# Patient Record
Sex: MALE | Race: BLACK OR AFRICAN AMERICAN | NOT HISPANIC OR LATINO
[De-identification: names, ages, dates, MRNs, and addresses within clinical notes are randomized per-mention and may not be internally consistent; named-entity substitution may affect disease eponyms.]

---

## 2018-03-19 PROBLEM — Z00.00 ENCOUNTER FOR PREVENTIVE HEALTH EXAMINATION: Status: ACTIVE | Noted: 2018-03-19

## 2018-03-20 ENCOUNTER — APPOINTMENT (OUTPATIENT)
Dept: PULMONOLOGY | Facility: CLINIC | Age: 78
End: 2018-03-20
Payer: MEDICARE

## 2018-03-20 VITALS
HEART RATE: 63 BPM | BODY MASS INDEX: 21.79 KG/M2 | HEIGHT: 63 IN | WEIGHT: 123 LBS | OXYGEN SATURATION: 99 % | DIASTOLIC BLOOD PRESSURE: 68 MMHG | SYSTOLIC BLOOD PRESSURE: 108 MMHG

## 2018-03-20 PROCEDURE — 94727 GAS DIL/WSHOT DETER LNG VOL: CPT

## 2018-03-20 PROCEDURE — 99203 OFFICE O/P NEW LOW 30 MIN: CPT | Mod: 25

## 2018-03-20 PROCEDURE — 94060 EVALUATION OF WHEEZING: CPT

## 2018-03-20 PROCEDURE — 94729 DIFFUSING CAPACITY: CPT

## 2018-03-21 RX ORDER — ALBUTEROL SULFATE 90 UG/1
108 (90 BASE) AEROSOL, METERED RESPIRATORY (INHALATION)
Qty: 18 | Refills: 0 | Status: ACTIVE | COMMUNITY
Start: 2018-02-14

## 2018-03-21 RX ORDER — MOMETASONE FUROATE 1 MG/G
0.1 OINTMENT TOPICAL
Qty: 45 | Refills: 0 | Status: COMPLETED | COMMUNITY
Start: 2017-12-26

## 2018-03-21 RX ORDER — ALFUZOSIN HYDROCHLORIDE 10 MG/1
10 TABLET, EXTENDED RELEASE ORAL
Qty: 30 | Refills: 0 | Status: ACTIVE | COMMUNITY
Start: 2017-10-02

## 2018-03-21 RX ORDER — AZITHROMYCIN 250 MG/1
250 TABLET, FILM COATED ORAL
Qty: 6 | Refills: 0 | Status: COMPLETED | COMMUNITY
Start: 2018-03-12

## 2018-03-21 RX ORDER — CHLORHEXIDINE GLUCONATE, 0.12% ORAL RINSE 1.2 MG/ML
0.12 SOLUTION DENTAL
Qty: 473 | Refills: 0 | Status: COMPLETED | COMMUNITY
Start: 2018-03-05

## 2018-04-24 ENCOUNTER — LABORATORY RESULT (OUTPATIENT)
Age: 78
End: 2018-04-24

## 2018-04-24 ENCOUNTER — APPOINTMENT (OUTPATIENT)
Dept: PULMONOLOGY | Facility: CLINIC | Age: 78
End: 2018-04-24
Payer: MEDICARE

## 2018-04-24 VITALS
OXYGEN SATURATION: 100 % | SYSTOLIC BLOOD PRESSURE: 110 MMHG | HEIGHT: 63 IN | DIASTOLIC BLOOD PRESSURE: 60 MMHG | BODY MASS INDEX: 22.5 KG/M2 | WEIGHT: 127 LBS | HEART RATE: 68 BPM

## 2018-04-24 DIAGNOSIS — R05 COUGH: ICD-10-CM

## 2018-04-24 PROCEDURE — 99214 OFFICE O/P EST MOD 30 MIN: CPT

## 2018-06-01 LAB
A ALTERNATA IGE QN: <0.1 KUA/L
A ALTERNATA IGE QN: <0.1 KUA/L
A FUMIGATUS IGE QN: <0.1 KUA/L
A FUMIGATUS IGE QN: <0.1 KUA/L
ALMOND IGE QN: <0.1 KUA/L
BASOPHILS # BLD AUTO: 0.01 K/UL
BASOPHILS NFR BLD AUTO: 0.3 %
BRAZIL NUT IGE QN: <0.1 KUA/L
C ALBICANS IGE QN: <0.1 KUA/L
C HERBARUM IGE QN: <0.1 KUA/L
C HERBARUM IGE QN: <0.1 KUA/L
CAT DANDER IGE QN: <0.1 KUA/L
CLAM IGE QN: <0.1 KUA/L
COCONUT IGE QN: 0
COCONUT IGE QN: <0.1 KUA/L
CODFISH IGE QN: <0.1 KUA/L
COMMON RAGWEED IGE QN: <0.1 KUA/L
CORN IGE QN: <0.1 KUA/L
COW MILK IGE QN: <0.1 KUA/L
D FARINAE IGE QN: <0.1 KUA/L
D PTERONYSS IGE QN: <0.1 KUA/L
DEPRECATED A ALTERNATA IGE RAST QL: 0
DEPRECATED A ALTERNATA IGE RAST QL: 0
DEPRECATED A FUMIGATUS IGE RAST QL: 0
DEPRECATED A FUMIGATUS IGE RAST QL: 0
DEPRECATED ALMOND IGE RAST QL: 0
DEPRECATED BRAZIL NUT IGE RAST QL: 0
DEPRECATED C ALBICANS IGE RAST QL: 0
DEPRECATED C HERBARUM IGE RAST QL: 0
DEPRECATED C HERBARUM IGE RAST QL: 0
DEPRECATED CAT DANDER IGE RAST QL: 0
DEPRECATED CLAM IGE RAST QL: 0
DEPRECATED CODFISH IGE RAST QL: 0
DEPRECATED COMMON RAGWEED IGE RAST QL: 0
DEPRECATED CORN IGE RAST QL: 0
DEPRECATED COW MILK IGE RAST QL: 0
DEPRECATED D FARINAE IGE RAST QL: 0
DEPRECATED D PTERONYSS IGE RAST QL: 0
DEPRECATED DOG DANDER IGE RAST QL: 0
DEPRECATED EGG WHITE IGE RAST QL: 0
DEPRECATED HAZELNUT IGE RAST QL: 0
DEPRECATED M RACEMOSUS IGE RAST QL: 0
DEPRECATED P NOTATUM IGE RAST QL: 0
DEPRECATED PEANUT IGE RAST QL: 0
DEPRECATED PEANUT IGE RAST QL: 0
DEPRECATED ROACH IGE RAST QL: 0
DEPRECATED S ROSTRATA IGE RAST QL: 0
DEPRECATED SCALLOP IGE RAST QL: <0.1 KUA/L
DEPRECATED SESAME SEED IGE RAST QL: 0
DEPRECATED SHRIMP IGE RAST QL: 0
DEPRECATED SOYBEAN IGE RAST QL: 0
DEPRECATED TIMOTHY IGE RAST QL: 0
DEPRECATED WALNUT IGE RAST QL: 0
DEPRECATED WHEAT IGE RAST QL: 0
DEPRECATED WHITE OAK IGE RAST QL: 0
DOG DANDER IGE QN: <0.1 KUA/L
EGG WHITE IGE QN: <0.1 KUA/L
EOSINOPHIL # BLD AUTO: 0.11 K/UL
EOSINOPHIL NFR BLD AUTO: 3.4 %
HAZELNUT IGE QN: <0.1 KUA/L
HCT VFR BLD CALC: 39.8 %
HGB BLD-MCNC: 12.8 G/DL
IMM GRANULOCYTES NFR BLD AUTO: 0 %
LYMPHOCYTES # BLD AUTO: 1.04 K/UL
LYMPHOCYTES NFR BLD AUTO: 32.2 %
M RACEMOSUS IGE QN: <0.1 KUA/L
MAN DIFF?: NORMAL
MCHC RBC-ENTMCNC: 30.8 PG
MCHC RBC-ENTMCNC: 32.2 GM/DL
MCV RBC AUTO: 95.9 FL
MONOCYTES # BLD AUTO: 0.2 K/UL
MONOCYTES NFR BLD AUTO: 6.2 %
NEUTROPHILS # BLD AUTO: 1.87 K/UL
NEUTROPHILS NFR BLD AUTO: 57.9 %
P NOTATUM IGE QN: <0.1 KUA/L
PEANUT IGE QN: <0.1 KUA/L
PEANUT IGE QN: <0.1 KUA/L
PLATELET # BLD AUTO: 162 K/UL
RBC # BLD: 4.15 M/UL
RBC # FLD: 12.2 %
ROACH IGE QN: <0.1 KUA/L
S ROSTRATA IGE QN: <0.1 KUA/L
SCALLOP IGE QN: 0
SCALLOP IGE QN: <0.1 KUA/L
SESAME SEED IGE QN: <0.1 KUA/L
SOYBEAN IGE QN: <0.1 KUA/L
TIMOTHY IGE QN: <0.1 KUA/L
TOTAL IGE SMQN RAST: 19 KU/L
WALNUT IGE QN: <0.1 KUA/L
WBC # FLD AUTO: 3.23 K/UL
WHEAT IGE QN: <0.1 KUA/L
WHITE OAK IGE QN: <0.1 KUA/L

## 2018-08-14 ENCOUNTER — APPOINTMENT (OUTPATIENT)
Dept: PULMONOLOGY | Facility: CLINIC | Age: 78
End: 2018-08-14

## 2021-07-22 ENCOUNTER — APPOINTMENT (OUTPATIENT)
Dept: UROLOGY | Facility: CLINIC | Age: 81
End: 2021-07-22
Payer: MEDICARE

## 2021-07-22 PROCEDURE — 99244 OFF/OP CNSLTJ NEW/EST MOD 40: CPT

## 2021-07-22 PROCEDURE — 99204 OFFICE O/P NEW MOD 45 MIN: CPT

## 2021-07-23 LAB
APPEARANCE: CLEAR
BACTERIA: NEGATIVE
BILIRUBIN URINE: NEGATIVE
BLOOD URINE: NEGATIVE
COLOR: YELLOW
GLUCOSE QUALITATIVE U: NEGATIVE
HYALINE CASTS: 0 /LPF
KETONES URINE: NEGATIVE
LEUKOCYTE ESTERASE URINE: NEGATIVE
MICROSCOPIC-UA: NORMAL
NITRITE URINE: NEGATIVE
PH URINE: 5.5
PROTEIN URINE: NEGATIVE
PSA FREE FLD-MCNC: 13 %
PSA FREE SERPL-MCNC: 1.59 NG/ML
PSA SERPL-MCNC: 12.1 NG/ML
RED BLOOD CELLS URINE: 1 /HPF
SPECIFIC GRAVITY URINE: 1.02
SQUAMOUS EPITHELIAL CELLS: 0 /HPF
URINE CYTOLOGY: NORMAL
UROBILINOGEN URINE: NORMAL
WHITE BLOOD CELLS URINE: 0 /HPF

## 2021-07-26 ENCOUNTER — NON-APPOINTMENT (OUTPATIENT)
Age: 81
End: 2021-07-26

## 2021-07-27 ENCOUNTER — APPOINTMENT (OUTPATIENT)
Dept: MRI IMAGING | Facility: IMAGING CENTER | Age: 81
End: 2021-07-27

## 2021-08-10 RX ORDER — DIAZEPAM 5 MG/1
5 TABLET ORAL
Qty: 5 | Refills: 0 | Status: ACTIVE | COMMUNITY
Start: 2021-08-10 | End: 1900-01-01

## 2021-08-12 ENCOUNTER — NON-APPOINTMENT (OUTPATIENT)
Age: 81
End: 2021-08-12

## 2021-08-21 ENCOUNTER — OUTPATIENT (OUTPATIENT)
Dept: OUTPATIENT SERVICES | Facility: HOSPITAL | Age: 81
LOS: 1 days | End: 2021-08-21
Payer: MEDICARE

## 2021-08-21 ENCOUNTER — APPOINTMENT (OUTPATIENT)
Dept: MRI IMAGING | Facility: IMAGING CENTER | Age: 81
End: 2021-08-21
Payer: MEDICARE

## 2021-08-21 ENCOUNTER — RESULT REVIEW (OUTPATIENT)
Age: 81
End: 2021-08-21

## 2021-08-21 DIAGNOSIS — R97.20 ELEVATED PROSTATE SPECIFIC ANTIGEN [PSA]: ICD-10-CM

## 2021-08-21 PROCEDURE — 72197 MRI PELVIS W/O & W/DYE: CPT | Mod: 26,MH

## 2021-08-21 PROCEDURE — 76498 UNLISTED MR PROCEDURE: CPT

## 2021-08-21 PROCEDURE — A9585: CPT

## 2021-08-21 PROCEDURE — 76498P: CUSTOM | Mod: 26,MH

## 2021-08-21 PROCEDURE — 72197 MRI PELVIS W/O & W/DYE: CPT

## 2022-01-26 ENCOUNTER — APPOINTMENT (OUTPATIENT)
Dept: UROLOGY | Facility: CLINIC | Age: 82
End: 2022-01-26
Payer: MEDICARE

## 2022-01-26 DIAGNOSIS — R97.20 ELEVATED PROSTATE, SPECIFIC ANTIGEN [PSA]: ICD-10-CM

## 2022-01-26 PROCEDURE — 99214 OFFICE O/P EST MOD 30 MIN: CPT

## 2022-07-28 ENCOUNTER — APPOINTMENT (OUTPATIENT)
Dept: UROLOGY | Facility: CLINIC | Age: 82
End: 2022-07-28

## 2022-07-28 LAB
APPEARANCE: CLEAR
BACTERIA: NEGATIVE
BILIRUBIN URINE: NEGATIVE
BLOOD URINE: NEGATIVE
COLOR: ABNORMAL
GLUCOSE QUALITATIVE U: NEGATIVE
HYALINE CASTS: 2 /LPF
KETONES URINE: NORMAL
LEUKOCYTE ESTERASE URINE: NEGATIVE
MICROSCOPIC-UA: NORMAL
NITRITE URINE: NEGATIVE
PH URINE: 6
PROTEIN URINE: NORMAL
RED BLOOD CELLS URINE: 1 /HPF
SPECIFIC GRAVITY URINE: 1.02
SQUAMOUS EPITHELIAL CELLS: 0 /HPF
UROBILINOGEN URINE: NORMAL
WHITE BLOOD CELLS URINE: 0 /HPF

## 2022-07-28 PROCEDURE — 99214 OFFICE O/P EST MOD 30 MIN: CPT

## 2022-09-07 ENCOUNTER — RX RENEWAL (OUTPATIENT)
Age: 82
End: 2022-09-07

## 2023-02-02 ENCOUNTER — APPOINTMENT (OUTPATIENT)
Dept: UROLOGY | Facility: CLINIC | Age: 83
End: 2023-02-02
Payer: MEDICARE

## 2023-02-02 VITALS
OXYGEN SATURATION: 98 % | RESPIRATION RATE: 14 BRPM | BODY MASS INDEX: 21.26 KG/M2 | SYSTOLIC BLOOD PRESSURE: 135 MMHG | HEART RATE: 65 BPM | WEIGHT: 120 LBS | TEMPERATURE: 97 F | DIASTOLIC BLOOD PRESSURE: 77 MMHG

## 2023-02-02 DIAGNOSIS — R97.20 ELEVATED PROSTATE, SPECIFIC ANTIGEN [PSA]: ICD-10-CM

## 2023-02-02 PROCEDURE — 99214 OFFICE O/P EST MOD 30 MIN: CPT

## 2023-02-03 LAB
APPEARANCE: CLEAR
BACTERIA: NEGATIVE
BILIRUBIN URINE: NEGATIVE
BLOOD URINE: NEGATIVE
COLOR: YELLOW
GLUCOSE QUALITATIVE U: NEGATIVE
HYALINE CASTS: 0 /LPF
KETONES URINE: NEGATIVE
LEUKOCYTE ESTERASE URINE: NEGATIVE
MICROSCOPIC-UA: NORMAL
NITRITE URINE: NEGATIVE
PH URINE: 5.5
PROTEIN URINE: NEGATIVE
PSA FREE FLD-MCNC: 11 %
PSA FREE SERPL-MCNC: 0.93 NG/ML
PSA SERPL-MCNC: 8.71 NG/ML
RED BLOOD CELLS URINE: 0 /HPF
SPECIFIC GRAVITY URINE: 1.01
SQUAMOUS EPITHELIAL CELLS: 0 /HPF
UROBILINOGEN URINE: NORMAL
WHITE BLOOD CELLS URINE: 0 /HPF

## 2023-08-03 ENCOUNTER — APPOINTMENT (OUTPATIENT)
Dept: UROLOGY | Facility: CLINIC | Age: 83
End: 2023-08-03
Payer: MEDICARE

## 2023-08-03 DIAGNOSIS — R35.0 FREQUENCY OF MICTURITION: ICD-10-CM

## 2023-08-03 DIAGNOSIS — R97.20 ELEVATED PROSTATE, SPECIFIC ANTIGEN [PSA]: ICD-10-CM

## 2023-08-03 PROCEDURE — 99213 OFFICE O/P EST LOW 20 MIN: CPT

## 2023-08-03 NOTE — PHYSICAL EXAM
[General Appearance - Well Developed] : well developed [General Appearance - Well Nourished] : well nourished [Normal Appearance] : normal appearance [Well Groomed] : well groomed [General Appearance - In No Acute Distress] : no acute distress [Abdomen Soft] : soft [Abdomen Tenderness] : non-tender [Costovertebral Angle Tenderness] : no ~M costovertebral angle tenderness [Urethral Meatus] : meatus normal [Urinary Bladder Findings] : the bladder was normal on palpation [Scrotum] : the scrotum was normal [Testes Mass (___cm)] : there were no testicular masses [No Prostate Nodules] : no prostate nodules [Edema] : no peripheral edema [] : no respiratory distress [Respiration, Rhythm And Depth] : normal respiratory rhythm and effort [Exaggerated Use Of Accessory Muscles For Inspiration] : no accessory muscle use [Oriented To Time, Place, And Person] : oriented to person, place, and time [Affect] : the affect was normal [Mood] : the mood was normal [Not Anxious] : not anxious

## 2023-09-10 ENCOUNTER — RX RENEWAL (OUTPATIENT)
Age: 83
End: 2023-09-10

## 2023-09-10 RX ORDER — ALFUZOSIN HYDROCHLORIDE 10 MG/1
10 TABLET, EXTENDED RELEASE ORAL
Qty: 90 | Refills: 3 | Status: ACTIVE | COMMUNITY
Start: 2021-07-22 | End: 1900-01-01

## 2023-10-30 ENCOUNTER — RX RENEWAL (OUTPATIENT)
Age: 83
End: 2023-10-30

## 2023-10-30 RX ORDER — FINASTERIDE 5 MG/1
5 TABLET, FILM COATED ORAL
Qty: 90 | Refills: 3 | Status: ACTIVE | COMMUNITY
Start: 2021-07-22 | End: 1900-01-01

## 2024-02-08 ENCOUNTER — APPOINTMENT (OUTPATIENT)
Dept: UROLOGY | Facility: CLINIC | Age: 84
End: 2024-02-08
Payer: MEDICARE

## 2024-02-08 DIAGNOSIS — N40.1 BENIGN PROSTATIC HYPERPLASIA WITH LOWER URINARY TRACT SYMPMS: ICD-10-CM

## 2024-02-08 DIAGNOSIS — N13.8 BENIGN PROSTATIC HYPERPLASIA WITH LOWER URINARY TRACT SYMPMS: ICD-10-CM

## 2024-02-08 PROCEDURE — G2211 COMPLEX E/M VISIT ADD ON: CPT

## 2024-02-08 PROCEDURE — 99214 OFFICE O/P EST MOD 30 MIN: CPT

## 2024-02-09 LAB
APPEARANCE: CLEAR
BACTERIA: NEGATIVE /HPF
BILIRUBIN URINE: NEGATIVE
BLOOD URINE: NEGATIVE
CAST: 0 /LPF
COLOR: YELLOW
EPITHELIAL CELLS: 0 /HPF
GLUCOSE QUALITATIVE U: NEGATIVE MG/DL
KETONES URINE: NEGATIVE MG/DL
LEUKOCYTE ESTERASE URINE: NEGATIVE
MICROSCOPIC-UA: NORMAL
NITRITE URINE: NEGATIVE
PH URINE: 6
PROTEIN URINE: NEGATIVE MG/DL
PSA FREE FLD-MCNC: 12 %
PSA FREE SERPL-MCNC: 1.01 NG/ML
PSA SERPL-MCNC: 8.59 NG/ML
RED BLOOD CELLS URINE: 0 /HPF
SPECIFIC GRAVITY URINE: 1.01
UROBILINOGEN URINE: 0.2 MG/DL
WHITE BLOOD CELLS URINE: 0 /HPF

## 2024-10-24 ENCOUNTER — APPOINTMENT (OUTPATIENT)
Dept: UROLOGY | Facility: CLINIC | Age: 84
End: 2024-10-24
Payer: MEDICARE

## 2024-10-24 DIAGNOSIS — N13.8 BENIGN PROSTATIC HYPERPLASIA WITH LOWER URINARY TRACT SYMPMS: ICD-10-CM

## 2024-10-24 DIAGNOSIS — N40.1 BENIGN PROSTATIC HYPERPLASIA WITH LOWER URINARY TRACT SYMPMS: ICD-10-CM

## 2024-10-24 PROCEDURE — G2211 COMPLEX E/M VISIT ADD ON: CPT

## 2024-10-24 PROCEDURE — 99214 OFFICE O/P EST MOD 30 MIN: CPT

## 2024-10-25 LAB
PSA FREE FLD-MCNC: 12 %
PSA FREE SERPL-MCNC: 0.98 NG/ML
PSA SERPL-MCNC: 7.98 NG/ML

## 2025-02-14 ENCOUNTER — RX RENEWAL (OUTPATIENT)
Age: 85
End: 2025-02-14

## 2025-04-23 ENCOUNTER — APPOINTMENT (OUTPATIENT)
Dept: UROLOGY | Facility: CLINIC | Age: 85
End: 2025-04-23
Payer: MEDICARE

## 2025-04-23 DIAGNOSIS — N40.1 BENIGN PROSTATIC HYPERPLASIA WITH LOWER URINARY TRACT SYMPMS: ICD-10-CM

## 2025-04-23 DIAGNOSIS — N13.8 BENIGN PROSTATIC HYPERPLASIA WITH LOWER URINARY TRACT SYMPMS: ICD-10-CM

## 2025-04-23 PROCEDURE — G2211 COMPLEX E/M VISIT ADD ON: CPT

## 2025-04-23 PROCEDURE — 99214 OFFICE O/P EST MOD 30 MIN: CPT

## 2025-04-24 LAB
APPEARANCE: CLEAR
BACTERIA: NEGATIVE /HPF
BILIRUBIN URINE: NEGATIVE
BLOOD URINE: NEGATIVE
CAST: 0 /LPF
COLOR: NORMAL
EPITHELIAL CELLS: 0 /HPF
GLUCOSE QUALITATIVE U: NEGATIVE MG/DL
KETONES URINE: NEGATIVE MG/DL
LEUKOCYTE ESTERASE URINE: NEGATIVE
MICROSCOPIC-UA: NORMAL
NITRITE URINE: NEGATIVE
PH URINE: 5.5
PROTEIN URINE: NEGATIVE MG/DL
PSA FREE FLD-MCNC: 13 %
PSA FREE SERPL-MCNC: 1.05 NG/ML
PSA SERPL-MCNC: 7.81 NG/ML
RED BLOOD CELLS URINE: 0 /HPF
SPECIFIC GRAVITY URINE: 1.02
UROBILINOGEN URINE: 0.2 MG/DL
WHITE BLOOD CELLS URINE: 0 /HPF